# Patient Record
Sex: FEMALE | Race: WHITE | Employment: FULL TIME | URBAN - METROPOLITAN AREA
[De-identification: names, ages, dates, MRNs, and addresses within clinical notes are randomized per-mention and may not be internally consistent; named-entity substitution may affect disease eponyms.]

---

## 2017-05-04 PROCEDURE — 87624 HPV HI-RISK TYP POOLED RSLT: CPT | Performed by: OBSTETRICS & GYNECOLOGY

## 2017-05-04 PROCEDURE — 88175 CYTOPATH C/V AUTO FLUID REDO: CPT | Performed by: OBSTETRICS & GYNECOLOGY

## 2017-05-05 ENCOUNTER — APPOINTMENT (OUTPATIENT)
Dept: GENERAL RADIOLOGY | Age: 47
End: 2017-05-05
Attending: NURSE PRACTITIONER
Payer: COMMERCIAL

## 2017-05-05 ENCOUNTER — HOSPITAL ENCOUNTER (OUTPATIENT)
Age: 47
Discharge: HOME | End: 2017-05-05
Payer: COMMERCIAL

## 2017-05-05 VITALS
DIASTOLIC BLOOD PRESSURE: 83 MMHG | RESPIRATION RATE: 16 BRPM | OXYGEN SATURATION: 100 % | WEIGHT: 184 LBS | HEART RATE: 69 BPM | BODY MASS INDEX: 33 KG/M2 | SYSTOLIC BLOOD PRESSURE: 137 MMHG | TEMPERATURE: 98 F

## 2017-05-05 DIAGNOSIS — S61.411A LACERATION OF RIGHT HAND WITHOUT FOREIGN BODY, INITIAL ENCOUNTER: Primary | ICD-10-CM

## 2017-05-05 PROCEDURE — 99203 OFFICE O/P NEW LOW 30 MIN: CPT

## 2017-05-05 PROCEDURE — 12001 RPR S/N/AX/GEN/TRNK 2.5CM/<: CPT

## 2017-05-05 PROCEDURE — 73130 X-RAY EXAM OF HAND: CPT | Performed by: NURSE PRACTITIONER

## 2017-05-05 PROCEDURE — 99213 OFFICE O/P EST LOW 20 MIN: CPT

## 2017-05-05 NOTE — ED PROVIDER NOTES
Patient Seen in: THE [facility] In [place]    History   Patient presents with:  Laceration Abrasion (integumentary)    Stated Complaint: FINGER LAC THIS MORN    HPI    54-year-old female who presents to the immediate care with complaints of la Constitutional: Negative. HENT: Negative. Skin:        Laceration to the right palm right below the right fifth finger   Neurological: Negative. Hematological: Negative. Psychiatric/Behavioral: Negative.     All other systems reviewed and are ne 5/5/2017  PROCEDURE:  XR HAND (MIN 3 VIEWS), RIGHT (CPT=73130)  TECHNIQUE:  Three views were obtained. COMPARISON:  None.   INDICATIONS:  FINGER LAC THIS MORN, possible glass FB  PATIENT STATED HISTORY: (As transcribed by Technologist)  Right 5th finger la

## 2017-05-05 NOTE — ED INITIAL ASSESSMENT (HPI)
Patient states she was washing dishes this morning and was washing a glass and it broke and cut her hand, about 25 minutes ago.

## 2017-05-11 ENCOUNTER — HOSPITAL ENCOUNTER (OUTPATIENT)
Age: 47
Discharge: HOME | End: 2017-05-11
Payer: COMMERCIAL

## 2017-05-11 VITALS
RESPIRATION RATE: 12 BRPM | DIASTOLIC BLOOD PRESSURE: 69 MMHG | OXYGEN SATURATION: 97 % | TEMPERATURE: 98 F | HEART RATE: 63 BPM | SYSTOLIC BLOOD PRESSURE: 135 MMHG

## 2017-05-11 DIAGNOSIS — Z48.02 ENCOUNTER FOR REMOVAL OF SUTURES: Primary | ICD-10-CM

## 2017-05-11 NOTE — ED PROVIDER NOTES
Patient Seen in: THE [facility] In [place]    History   Patient presents with:  Laceration Abrasion (integumentary)    Stated Complaint: WOUND CHECK    HPI    Patient is a pleasant 16-year-old female.   6 days prior to arrival, patient had 3 damon Current:/69 mmHg  Pulse 63  Temp(Src) 98.3 °F (36.8 °C) (Oral)  Resp 12  SpO2 97%  LMP 05/02/2017 (Approximate)        Physical Exam  Gen: Well appearing, well groomed, alert and aware x 3  Neck: Supple, full range of motion  Eye examination: E

## 2018-06-05 PROBLEM — M72.2 PLANTAR FASCIITIS: Status: ACTIVE | Noted: 2018-06-05

## 2018-06-05 PROBLEM — M77.50 TENDONITIS OF FOOT: Status: ACTIVE | Noted: 2018-06-05

## 2018-09-21 PROBLEM — D25.1 INTRAMURAL AND SUBMUCOUS LEIOMYOMA OF UTERUS: Status: ACTIVE | Noted: 2018-09-21

## 2018-09-21 PROBLEM — D25.0 INTRAMURAL AND SUBMUCOUS LEIOMYOMA OF UTERUS: Status: ACTIVE | Noted: 2018-09-21

## 2018-09-21 PROCEDURE — 88175 CYTOPATH C/V AUTO FLUID REDO: CPT | Performed by: OBSTETRICS & GYNECOLOGY

## 2018-09-21 PROCEDURE — 87624 HPV HI-RISK TYP POOLED RSLT: CPT | Performed by: OBSTETRICS & GYNECOLOGY

## 2021-02-09 DIAGNOSIS — Z23 NEED FOR VACCINATION: ICD-10-CM

## 2021-09-16 ENCOUNTER — HOSPITAL ENCOUNTER (EMERGENCY)
Age: 51
Discharge: HOME | End: 2021-09-16
Attending: EMERGENCY MEDICINE
Payer: COMMERCIAL

## 2021-09-16 VITALS
BODY MASS INDEX: 34.41 KG/M2 | SYSTOLIC BLOOD PRESSURE: 128 MMHG | HEART RATE: 71 BPM | WEIGHT: 187 LBS | TEMPERATURE: 99 F | OXYGEN SATURATION: 99 % | RESPIRATION RATE: 16 BRPM | HEIGHT: 62 IN | DIASTOLIC BLOOD PRESSURE: 59 MMHG

## 2021-09-16 DIAGNOSIS — G51.0 BELL'S PALSY: Primary | ICD-10-CM

## 2021-09-16 PROCEDURE — 99283 EMERGENCY DEPT VISIT LOW MDM: CPT

## 2021-09-16 RX ORDER — PREDNISONE 20 MG/1
60 TABLET ORAL DAILY
Qty: 15 TABLET | Refills: 0 | Status: SHIPPED | OUTPATIENT
Start: 2021-09-16 | End: 2021-09-21

## 2021-09-16 NOTE — ED PROVIDER NOTES
Patient Seen in: THE [facility] In [place]      History   Patient presents with:  Numbness Weakness    Stated Complaint: facial droop ,possible bells palsy    Subjective:   HPI    Patient is a 62-year-old woman presents with left-sided fa 84.8 kg   LMP 09/02/2021   SpO2 99%   BMI 34.20 kg/m²         Physical Exam    General: Well-appearing patient of stated age resting comfortably  HEENT: Normocephalic atraumatic. Nonicteric sclera.   Moist mucous membranes  Lungs: No tachypnea  Cardiac: No

## 2021-10-01 ENCOUNTER — OFFICE VISIT (OUTPATIENT)
Dept: NEUROLOGY | Facility: CLINIC | Age: 51
End: 2021-10-01
Payer: COMMERCIAL

## 2021-10-01 VITALS
HEIGHT: 62 IN | BODY MASS INDEX: 37.17 KG/M2 | OXYGEN SATURATION: 96 % | WEIGHT: 202 LBS | RESPIRATION RATE: 16 BRPM | HEART RATE: 88 BPM | SYSTOLIC BLOOD PRESSURE: 112 MMHG | DIASTOLIC BLOOD PRESSURE: 70 MMHG

## 2021-10-01 DIAGNOSIS — G51.0 BELL'S PALSY: Primary | ICD-10-CM

## 2021-10-01 PROCEDURE — 3074F SYST BP LT 130 MM HG: CPT | Performed by: HOSPITALIST

## 2021-10-01 PROCEDURE — 99202 OFFICE O/P NEW SF 15 MIN: CPT | Performed by: HOSPITALIST

## 2021-10-01 PROCEDURE — 3008F BODY MASS INDEX DOCD: CPT | Performed by: HOSPITALIST

## 2021-10-01 PROCEDURE — 3078F DIAST BP <80 MM HG: CPT | Performed by: HOSPITALIST

## 2021-10-01 RX ORDER — TETRAHYDROZOLINE HCL 0.05 %
1 DROPS OPHTHALMIC (EYE) 4 TIMES DAILY PRN
COMMUNITY
End: 2021-11-29 | Stop reason: ALTCHOICE

## 2021-10-01 NOTE — PROGRESS NOTES
9/16/21 ED referred New patient- Patient states her left-sided facial weakness is still present but not as severe. Patient states her bottom left eye lid has been twitching. Patient states her left ear has pain & has fluid in it.  Patient states she has to

## 2021-10-01 NOTE — H&P
Neurology H&P    Everardo Squires Patient Status:  No patient class for patient encounter    1970 MRN [id number]   Location [facility] Attending No att. providers found   Hosp Day # 0 PCP [facility] Credit, DO     Subjective:  Everardo Squires is a( • OTHER SURGICAL HISTORY N/A 6/2/2016    Procedure: S&N HYSTEROSCOPY, POSSIBLE EXCISION FIBROIDS/POLYP;  Surgeon: Pamela No MD;  Location: [facility] MAIN OR   • SKIN SURGERY Left 12/18/15    Left Upper Arm, excision, R/O- Neurofibroma   • SKIN TISSU Gait, Coordination, and Reflexes     Gait  Gait: normal    Coordination   Romberg: negative  Finger to nose coordination: normal    Tremor   Resting tremor: absent  Intention tremor: absent  Action tremor: absent         Labs:   A1C: 5.9     Imaging:

## (undated) NOTE — ED AVS SNAPSHOT
[facility] in [address] [place] [address] Drive,[address]    [address] [place] [address]    Phone:  [phone]    Fax:  [address] [employer]   MRN: [id number]    Department:  [facility] in [facility]   Date of Visit:  5/5/2017 If you have any problems with your follow-up, please call our  at [phone]. Si usted tiene algun problema con damon sequimiento, por favor llame a nuestro adminstrador de casos al [phone].     Expect to receive an electronic reques Marleny Jensen [address] ([address]) [place] ([address] [phone] [facility]9   [facility] [address] ([address] [facility]) [address]  (Do PROCEDURE:  XR HAND (MIN 3 VIEWS), RIGHT (CPT=73130)     TECHNIQUE:  Three views were obtained. COMPARISON:  None.      INDICATIONS:  FINGER LAC THIS MORN, possible glass FB     PATIENT STATED HISTORY: (As transcribed by Technologist)  Right 5th finger

## (undated) NOTE — ED AVS SNAPSHOT
[facility] in [address] [place] [address] Drive,[address] Floor    [address] [facility]    Phone:  [phone]    Fax:  [phone] [facility]   MRN: [id number]    Department:  [facility] in [facility] END   Date of Visit:  5/11/2017 Si usted tiene algun problema con damon sequimiento, por favor llame a nuestro adminstrador de antolinos al [phone]. Expect to receive an electronic request (by e-mail or text) to complete a self-assessment the day after your visit.   You may also recei Marily Jensen [address] (92 [facility]) [place] [address] Krystal Matthews. (900 [facility]) [address]  ([address] [facility] [address]) [address] [facility] office, you can view your past visit information in [facility] by going to Visits < Visit Summaries. [facility] questions? Call [phone] for help. [facility] [employer] is NOT to be used for urgent needs. For medical emergencies, dial 911.